# Patient Record
Sex: MALE | Race: WHITE | NOT HISPANIC OR LATINO | URBAN - METROPOLITAN AREA
[De-identification: names, ages, dates, MRNs, and addresses within clinical notes are randomized per-mention and may not be internally consistent; named-entity substitution may affect disease eponyms.]

---

## 2017-10-11 NOTE — TELEPHONE ENCOUNTER
APPT INFORMATION    Date & Time:  11/7/17   Reason for Appt: Ear infection   Referring Name/Clinic:  none      Yes / No COMMENT / NOTES DATE & ACTION   Patient Contacted?  no  Records receved                     RECORDS CLINIC NAME  (use N/A if no records ) DATE & ACTION RECEIVED RECS & IMG? Y/N   (may include other helpful notes)   External Clinics:  [facility]  records received                 Internal Clinics:              Records Received From:  [facility]      Date / Exam / Location   (*specify location only if different than the sending clinic) COMMENTS / MISSING  (be specific)   Office Notes:  10/6/15, 10/9/15, 2/2/16, 2/16/16, 7/11/16, 7/27/16, 11/30/16, 12/14/16, 2/15/17, 4/4/17, 4/26/17, 5/25/17    Pathology Reports:  (use collection date & specify the specimen)  ear 2/15/17, 11/30/16, 7/11/16, 2/16/16, 12/9/16    Other: Labs

## 2017-11-03 DIAGNOSIS — Z01.10 EXAMINATION OF EARS AND HEARING: Primary | ICD-10-CM

## 2017-11-07 ENCOUNTER — OFFICE VISIT (OUTPATIENT)
Dept: OTOLARYNGOLOGY | Facility: CLINIC | Age: 67
End: 2017-11-07

## 2017-11-07 ENCOUNTER — PRE VISIT (OUTPATIENT)
Dept: OTOLARYNGOLOGY | Facility: CLINIC | Age: 67
End: 2017-11-07

## 2017-11-07 ENCOUNTER — OFFICE VISIT (OUTPATIENT)
Dept: AUDIOLOGY | Facility: CLINIC | Age: 67
End: 2017-11-07

## 2017-11-07 VITALS — HEIGHT: 73 IN | WEIGHT: 268 LBS | BODY MASS INDEX: 35.52 KG/M2

## 2017-11-07 DIAGNOSIS — H90.3 SENSORY HEARING LOSS, BILATERAL: Primary | ICD-10-CM

## 2017-11-07 DIAGNOSIS — H61.23 EXCESSIVE CERUMEN IN BOTH EAR CANALS: ICD-10-CM

## 2017-11-07 DIAGNOSIS — H60.392 INFECTIVE OTITIS EXTERNA, LEFT: Primary | ICD-10-CM

## 2017-11-07 PROBLEM — E78.2 MIXED HYPERLIPIDEMIA: Status: ACTIVE | Noted: 2017-11-07

## 2017-11-07 PROBLEM — D50.9 IRON DEFICIENCY ANEMIA: Status: ACTIVE | Noted: 2017-11-07

## 2017-11-07 PROBLEM — E11.9 CONTROLLED TYPE 2 DIABETES MELLITUS WITHOUT COMPLICATION, WITHOUT LONG-TERM CURRENT USE OF INSULIN (H): Status: ACTIVE | Noted: 2017-11-07

## 2017-11-07 RX ORDER — GLIPIZIDE 5 MG/1
5 TABLET ORAL
COMMUNITY
Start: 2017-07-20

## 2017-11-07 RX ORDER — ATORVASTATIN CALCIUM 10 MG/1
5 TABLET, FILM COATED ORAL
COMMUNITY
Start: 2016-09-28

## 2017-11-07 RX ORDER — METFORMIN HCL 500 MG
2000 TABLET, EXTENDED RELEASE 24 HR ORAL
COMMUNITY
Start: 2017-06-13

## 2017-11-07 RX ORDER — ASPIRIN 81 MG/1
81 TABLET, CHEWABLE ORAL
COMMUNITY

## 2017-11-07 RX ORDER — OFLOXACIN 3 MG/ML
SOLUTION AURICULAR (OTIC)
Qty: 10 ML | Refills: 1 | Status: SHIPPED | OUTPATIENT
Start: 2017-11-07

## 2017-11-07 NOTE — MR AVS SNAPSHOT
After Visit Summary   11/7/2017    Kuldip Bonilla    MRN: [id number]           Patient Information     Date Of Birth          1950        Visit Information        Provider Department      11/7/2017 2:00 PM Nissen, Rick L, MD [facility]        Today's Diagnoses     Dysfunction of eustachian tube    -  1      Care Instructions    1.  You were seen in the ENT Clinic today by Dr. Nissen.  If you have any questions or concerns after your appointment, please call [phone].  Press option #1 for scheduling related needs.  Press option #3 for Nurse advice.  2.  Plan is to return to clinic in 2 months, no audio needed.      Kalie PEPE, RN  [facility]   Head & Neck Surgery                   Follow-ups after your visit        Your next 10 appointments already scheduled     Jan 16, 2018  1:15 PM CST   (Arrive by 1:00 PM)   RETURN NEUROTOLOGY with Rick L Nissen, MD [facility] Ear Nose and Throat ([facility] [place])    [address] [place] [address] [phone]              Who to contact     Please call your clinic at [phone] to:    Ask questions about your health    Make or cancel appointments    Discuss your medicines    Learn about your test results    Speak to your doctor   If you have compliments or concerns about an experience at your clinic, or if you wish to file a complaint, please contact [facility] Patient Relations at [phone] or email us at [email or web address]         Additional Information About Your Visit        MyChart Information     ImpressPages gives you secure access to your electronic health record. If you see a primary care provider, you can also send messages to your care team and make appointments. If you have questions, please call your primary care clinic.  If you do not have a primary care provider, please call [phone] and they will assist  "you.      elarm is an electronic gateway that provides easy, online access to your medical records. With elarm, you can request a clinic appointment, read your test results, renew a prescription or communicate with your care team.     To access your existing account, please contact your [facility] or call [phone] for assistance.        Care EveryWhere ID     This is your Care EveryWhere ID. This could be used by other organizations to access your [facility] medical records  [id number]        Your Vitals Were     Height BMI (Body Mass Index)                1.854 m (6' 1\") 35.36 kg/m2           Blood Pressure from Last 3 Encounters:   No data found for BP    Weight from Last 3 Encounters:   11/07/17 121.6 kg (268 lb)              Today, you had the following     No orders found for display         Today's Medication Changes          These changes are accurate as of: 11/7/17  2:38 PM.  If you have any questions, ask your nurse or doctor.               Start taking these medicines.        Dose/Directions    ofloxacin 0.3 % otic solution   Commonly known as:  FLOXIN   Used for:  Dysfunction of eustachian tube   Started by:  Nissen, Rick L, MD        3 gtts to affected ear TID x 1 week, then 3 gtts to affected ear BID x 1 week, then 3 gtts to affected ear QD x 1 week   Quantity:  10 mL   Refills:  1            Where to get your medicines      These medications were sent to [facility] [address] [place] [address]  [address] [place] [address]     Phone:  [phone]     ofloxacin 0.3 % otic solution                Primary Care Provider Office Phone # Fax #    Sydney ESTELA Santos [phone]       [facility] [address]  [facility] [address]        Equal Access to Services     CHER BURNETT AH: Daysi Gomez, wamelvinda luqadaha, qaybta kaalmahitesh lim, nadia hanley. So waerin " [phone].    ATENCIÓN: Si sushil cuadra, tiene a wang disposición servicios gratuitos de asistencia lingüística. Joelle melendrez [phone].    We comply with applicable federal civil rights laws and [place] laws. We do not discriminate on the basis of race, color, national origin, age, disability, sex, sexual orientation, or gender identity.            Thank you!     Thank you for choosing [facility] EAR NOSE AND THROAT  for your care. Our goal is always to provide you with excellent care. Hearing back from our patients is one way we can continue to improve our services. Please take a few minutes to complete the written survey that you may receive in the mail after your visit with us. Thank you!             Your Updated Medication List - Protect others around you: Learn how to safely use, store and throw away your medicines at www.disposemymeds.org.          This list is accurate as of: 11/7/17  2:38 PM.  Always use your most recent med list.                   Brand Name Dispense Instructions for use Diagnosis    aspirin 81 MG chewable tablet      Take 81 mg by mouth        atorvastatin 10 MG tablet    LIPITOR     Take 5 mg by mouth        DIABETIC TUSSIN MUCUS RELIEF PO      Ascensia Contour Test Strip        glipiZIDE 5 MG tablet    GLUCOTROL     Take 5 mg by mouth        metFORMIN 500 MG 24 hr tablet    GLUCOPHAGE-XR     Take 2,000 mg by mouth        MULTIVITAMIN & MINERAL PO           ofloxacin 0.3 % otic solution    FLOXIN    10 mL    3 gtts to affected ear TID x 1 week, then 3 gtts to affected ear BID x 1 week, then 3 gtts to affected ear QD x 1 week    Dysfunction of eustachian tube

## 2017-11-07 NOTE — PROGRESS NOTES
Dear Sydney Abarca:    I had the pleasure of meeting Kuldip Bonilla in consultation today at the [facility] at your request.    HISTORY OF PRESENT ILLNESS: Patient is a 67-year-old in today for assessment of his left ear and chronic drainage. He says he's been getting drainage from the left ear for about a year and a half. It's usually just trust him in the morning that he notices, it itches a lot. Finger may be moist when he puts his finger there. There is no active drainage. He's been seen and has had cultures performed, placed on drops. Used the drops only for less than a week. He will seem to clear but it continues to recur. Says several different drops again only for a few days. This only happens on the left. Hearing seems equal. No dizziness. Has occasional tinnitus. He does have noise exposure being an  for 20 years in the service.    ALLERGIES:  Not on File    HABITS:   Alcohol use Yes    History   Smoking Status     Former Smoker     Packs/day: 2.00     Years: 29.00     Types: Cigarettes     Start date: 1964     Quit date: 1993   Smokeless Tobacco     Never Used         PAST MEDICAL HISTORY: Please see today's intake form (for the remainder of the PMH) which I reviewed and signed.  Past Medical History:   Diagnosis Date     Diabetes (H)      Tinnitus 2017       FAMILY HISTORY/SOCIAL HISTORY:   Family History   Problem Relation Age of Onset     DIABETES Mother           DIABETES Brother           HEART DISEASE Brother      Hypertension Brother      Migraines Brother      HEART DISEASE Sister      Hypertension Father          Social History     Social History     Marital status:      Spouse name: N/A     Number of children: N/A     Years of education: N/A     Occupational History     Not on file.     Social History Main Topics     Smoking status: Former Smoker     Packs/day: 2.00     Years: 29.00      Types: Cigarettes     Start date: 1/1/1964     Quit date: 1/7/1993     Smokeless tobacco: Never Used     Alcohol use Yes     Drug use: No     Sexual activity: Not Currently     Partners: Female     Birth control/ protection: None     Other Topics Concern     Not on file     Social History Narrative     No narrative on file       REVIEW OF SYSTEMS: Please see today's intake form (for the remainder of the ROS) which I have reviewed and signed.    PHYSICIAL EXAMINATION:  Constitutional: The patient was well-groomed and in no acute distress.   Skin: Warm and pink.  Psychiatric: The patient's affect was calm, cooperative, and appropriate.   Respiratory: Breathing comfortably without stridor or exertion of accessory muscles.  Eyes: Pupils were equal and reactive. Extraocular movement intact.   Head: Normocephalic and atraumatic. No lesions or scars.  Ears: Both ears examined with a microscope for microscopic assessment and cleaning. Breasts side was cleaned with curettes of cerumen. Following cleaning, TM and middle ear looked normal. The opposite ear was cleaned and examined using microscope, curet, suction and similar techniques. Minimal drainage was present around the TM, no perforation or significant retraction noted. Drops were applied.  Nose: Sinuses were nontender. Anterior rhinoscopy revealed midline septum and absence of purulence or polyps.  Oral Cavity: Normal tongue, floor of moth, buccal mucosa, and palate. No lesions or masses on inspection or palpation. No abnormal lymph tissue in the oropharynx.   Neck: The parotid is soft without masses. Supple with normal laryngeal and tracheal landmarks.   Lymphatic: There is no palpable lymphadenopathy or other masses in the neck.   Neurologic: Alert and oriented x 3. Cranial nerves III-XI within normal limits. Voice quality normal.  Cerebellar Function Tests:  Grossly normal    Audiogram:  Audiogram performed shows a high-frequency sensorineural hearing loss about  1000 Hz, fairly symmetrical. 100% discrimination in each ear. Type A tympanograms bilaterally    IMPRESSION AND PLAN: Token for some time and I don't see any concerns. Sounds he's had chronic intermittent drainage. He has not been well careful with water. We're going to keep the ear very dry. Going to put him on drops after cleaning today for an extended period of time. 3 drops 3 times a day for a week, twice a day for week, and once a day for a week. I'll see him back in a month to make sure things have cleared.    Thank you very much for the opportunity to participate in the care of your patient.    Rick L Nissen MD

## 2017-11-07 NOTE — LETTER
2017       RE: Kuldip Bonilla  [address] [place] [address]     Dear Colleague,    Thank you for referring your patient, Kuldip Bonilla, to the [facility] E[facility] at [facility]. Please see a copy of my visit note below.    Dear Sydney Abarca:    I had the pleasure of meeting Kuldip Bonilla in consultation today at the [facility] at your request.    HISTORY OF PRESENT ILLNESS: Patient is a 67-year-old in today for assessment of his left ear and chronic drainage. He says he's been getting drainage from the left ear for about a year and a half. It's usually just trust him in the morning that he notices, it itches a lot. Finger may be moist when he puts his finger there. There is no active drainage. He's been seen and has had cultures performed, placed on drops. Used the drops only for less than a week. He will seem to clear but it continues to recur. Says several different drops again only for a few days. This only happens on the left. Hearing seems equal. No dizziness. Has occasional tinnitus. He does have noise exposure being an  for 20 years in the service.    ALLERGIES:  Not on File    HABITS:   Alcohol use Yes    History   Smoking Status     Former Smoker     Packs/day: 2.00     Years: 29.00     Types: Cigarettes     Start date: 1964     Quit date: 1993   Smokeless Tobacco     Never Used         PAST MEDICAL HISTORY: Please see today's intake form (for the remainder of the PMH) which I reviewed and signed.  Past Medical History:   Diagnosis Date     Diabetes (H)      Tinnitus 2017       FAMILY HISTORY/SOCIAL HISTORY:   Family History   Problem Relation Age of Onset     DIABETES Mother           DIABETES Brother           HEART DISEASE Brother      Hypertension Brother      Migraines Brother      HEART DISEASE Sister      Hypertension Father          Social  History     Social History     Marital status:      Spouse name: N/A     Number of children: N/A     Years of education: N/A     Occupational History     Not on file.     Social History Main Topics     Smoking status: Former Smoker     Packs/day: 2.00     Years: 29.00     Types: Cigarettes     Start date: 1/1/1964     Quit date: 1/7/1993     Smokeless tobacco: Never Used     Alcohol use Yes     Drug use: No     Sexual activity: Not Currently     Partners: Female     Birth control/ protection: None     Other Topics Concern     Not on file     Social History Narrative     No narrative on file       REVIEW OF SYSTEMS: Please see today's intake form (for the remainder of the ROS) which I have reviewed and signed.    PHYSICIAL EXAMINATION:  Constitutional: The patient was well-groomed and in no acute distress.   Skin: Warm and pink.  Psychiatric: The patient's affect was calm, cooperative, and appropriate.   Respiratory: Breathing comfortably without stridor or exertion of accessory muscles.  Eyes: Pupils were equal and reactive. Extraocular movement intact.   Head: Normocephalic and atraumatic. No lesions or scars.  Ears: Both ears examined with a microscope for microscopic assessment and cleaning. Breasts side was cleaned with curettes of cerumen. Following cleaning, TM and middle ear looked normal. The opposite ear was cleaned and examined using microscope, curet, suction and similar techniques. Minimal drainage was present around the TM, no perforation or significant retraction noted. Drops were applied.  Nose: Sinuses were nontender. Anterior rhinoscopy revealed midline septum and absence of purulence or polyps.  Oral Cavity: Normal tongue, floor of moth, buccal mucosa, and palate. No lesions or masses on inspection or palpation. No abnormal lymph tissue in the oropharynx.   Neck: The parotid is soft without masses. Supple with normal laryngeal and tracheal landmarks.   Lymphatic: There is no palpable  lymphadenopathy or other masses in the neck.   Neurologic: Alert and oriented x 3. Cranial nerves III-XI within normal limits. Voice quality normal.  Cerebellar Function Tests:  Grossly normal    Audiogram:  Audiogram performed shows a high-frequency sensorineural hearing loss about 1000 Hz, fairly symmetrical. 100% discrimination in each ear. Type A tympanograms bilaterally    IMPRESSION AND PLAN: Token for some time and I don't see any concerns. Sounds he's had chronic intermittent drainage. He has not been well careful with water. We're going to keep the ear very dry. Going to put him on drops after cleaning today for an extended period of time. 3 drops 3 times a day for a week, twice a day for week, and once a day for a week. I'll see him back in a month to make sure things have cleared.    Thank you very much for the opportunity to participate in the care of your patient.    Rick L Nissen MD

## 2017-11-07 NOTE — MR AVS SNAPSHOT
After Visit Summary   11/7/2017    Kuldip Bonilla    MRN: [id number]           Patient Information     Date Of Birth          1950        Visit Information        Provider Department      11/7/2017 12:00 PM [place] [facility]        Today's Diagnoses     Sensory hearing loss, bilateral    -  1       Follow-ups after your visit        Your next 10 appointments already scheduled     Jan 16, 2018  1:15 PM CST   (Arrive by 1:00 PM)   RETURN NEUROTOLOGY with Rick L Nissen, MD [facility] Ear Nose and Throat ([facility])    [address] [place] [address] [phone]              Who to contact     Please call your clinic at [phone] to:    Ask questions about your health    Make or cancel appointments    Discuss your medicines    Learn about your test results    Speak to your doctor   If you have compliments or concerns about an experience at your clinic, or if you wish to file a complaint, please contact [facility] Patient Relations at [phone] or email us at [email or web address]         Additional Information About Your Visit        MyChart Information     Deliveroo gives you secure access to your electronic health record. If you see a primary care provider, you can also send messages to your care team and make appointments. If you have questions, please call your primary care clinic.  If you do not have a primary care provider, please call [phone] and they will assist you.      Deliveroo is an electronic gateway that provides easy, online access to your medical records. With Deliveroo, you can request a clinic appointment, read your test results, renew a prescription or communicate with your care team.     To access your existing account, please contact your [facility] or call [phone] for assistance.        Care EveryWhere ID     This is your  Care EveryWhere ID. This could be used by other organizations to access your [facility] medical records  [id number]         Blood Pressure from Last 3 Encounters:   No data found for BP    Weight from Last 3 Encounters:   11/07/17 121.6 kg (268 lb)              We Performed the Following     AUDIOGRAM/TYMPANOGRAM - INTERFACE     [facility] Audiometry Thrshld Eval & Speech Recog (81892)     Bre   (99360)     Tymps / Reflex   (54353)          Today's Medication Changes          These changes are accurate as of: 11/7/17  2:42 PM.  If you have any questions, ask your nurse or doctor.               Start taking these medicines.        Dose/Directions    ofloxacin 0.3 % otic solution   Commonly known as:  FLOXIN   Used for:  Dysfunction of eustachian tube   Started by:  Nissen, Rick L, MD        3 gtts to affected ear TID x 1 week, then 3 gtts to affected ear BID x 1 week, then 3 gtts to affected ear QD x 1 week   Quantity:  10 mL   Refills:  1            Where to get your medicines      These medications were sent to [facility] [address] [place] [address] [address]  [address] [facility] [address], [facility] access hospital [address]     Phone:  [phone]     ofloxacin 0.3 % otic solution                Primary Care Provider Office Phone # Fax #    Sydney Santos [phone]       [facility] [address]  [facility] [address]        Equal Access to Services     [facility] AH: Hadii aad ku hadasho Soomaali, waaxda luqadaha, qaybta kaalmada adeegyada, nadia hanley. So [facility] [phone].    ATENCIÓN: Si habla español, tiene a wang disposición servicios gratuitos de asistencia lingüística. Llame al [phone].    We comply with applicable federal civil rights laws and [place] laws. We do not discriminate on the basis of race, color, national origin, age, disability, sex, sexual orientation, or gender identity.            Thank you!     Thank you for choosing [facility] AUDIOLOGY   for your care. Our goal is always to provide you with excellent care. Hearing back from our patients is one way we can continue to improve our services. Please take a few minutes to complete the written survey that you may receive in the mail after your visit with us. Thank you!             Your Updated Medication List - Protect others around you: Learn how to safely use, store and throw away your medicines at www.disposemymeds.org.          This list is accurate as of: 11/7/17  2:42 PM.  Always use your most recent med list.                   Brand Name Dispense Instructions for use Diagnosis    aspirin 81 MG chewable tablet      Take 81 mg by mouth        atorvastatin 10 MG tablet    LIPITOR     Take 5 mg by mouth        DIABETIC TUSSIN MUCUS RELIEF PO      Ascensia Contour Test Strip        glipiZIDE 5 MG tablet    GLUCOTROL     Take 5 mg by mouth        metFORMIN 500 MG 24 hr tablet    GLUCOPHAGE-XR     Take 2,000 mg by mouth        MULTIVITAMIN & MINERAL PO           ofloxacin 0.3 % otic solution    FLOXIN    10 mL    3 gtts to affected ear TID x 1 week, then 3 gtts to affected ear BID x 1 week, then 3 gtts to affected ear QD x 1 week    Dysfunction of eustachian tube

## 2017-11-07 NOTE — NURSING NOTE
Chief Complaint   Patient presents with     Consult     EAr infection     Kesha Greer Medical Assistant

## 2017-11-07 NOTE — PROGRESS NOTES
AUDIOLOGY REPORT    SUMMARY: Audiology visit completed. See audiogram for results.      RECOMMENDATIONS: Follow-up with ENT.    Tamiko Wang.  Licensed Audiologist  [place] #[id number]

## 2017-11-07 NOTE — PATIENT INSTRUCTIONS
1.  You were seen in the ENT Clinic today by Dr. Nissen.  If you have any questions or concerns after your appointment, please call [phone].  Press option #1 for scheduling related needs.  Press option #3 for Nurse advice.  2.  Plan is to return to clinic in 2 months, no audio needed.  Please keep your ear dry.      Kalie RAHMANN, RN  [facility] ENT   Head & Neck Surgery

## 2020-03-11 ENCOUNTER — HEALTH MAINTENANCE LETTER (OUTPATIENT)
Age: 70
End: 2020-03-11

## 2020-12-27 ENCOUNTER — HEALTH MAINTENANCE LETTER (OUTPATIENT)
Age: 70
End: 2020-12-27

## 2021-04-25 ENCOUNTER — HEALTH MAINTENANCE LETTER (OUTPATIENT)
Age: 71
End: 2021-04-25

## 2021-08-14 ENCOUNTER — HEALTH MAINTENANCE LETTER (OUTPATIENT)
Age: 71
End: 2021-08-14

## 2021-10-09 ENCOUNTER — HEALTH MAINTENANCE LETTER (OUTPATIENT)
Age: 71
End: 2021-10-09

## 2022-03-26 ENCOUNTER — HEALTH MAINTENANCE LETTER (OUTPATIENT)
Age: 72
End: 2022-03-26

## 2022-05-21 ENCOUNTER — HEALTH MAINTENANCE LETTER (OUTPATIENT)
Age: 72
End: 2022-05-21

## 2022-09-17 ENCOUNTER — HEALTH MAINTENANCE LETTER (OUTPATIENT)
Age: 72
End: 2022-09-17

## 2023-01-23 ENCOUNTER — HEALTH MAINTENANCE LETTER (OUTPATIENT)
Age: 73
End: 2023-01-23

## 2023-06-04 ENCOUNTER — HEALTH MAINTENANCE LETTER (OUTPATIENT)
Age: 73
End: 2023-06-04

## 2023-07-29 ENCOUNTER — HEALTH MAINTENANCE LETTER (OUTPATIENT)
Age: 73
End: 2023-07-29

## 2024-02-24 ENCOUNTER — HEALTH MAINTENANCE LETTER (OUTPATIENT)
Age: 74
End: 2024-02-24